# Patient Record
Sex: FEMALE
[De-identification: names, ages, dates, MRNs, and addresses within clinical notes are randomized per-mention and may not be internally consistent; named-entity substitution may affect disease eponyms.]

---

## 2018-03-14 ENCOUNTER — HOSPITAL ENCOUNTER (OUTPATIENT)
Dept: HOSPITAL 31 - C.SDS | Age: 58
Discharge: HOME | End: 2018-03-14
Attending: ANESTHESIOLOGY
Payer: MEDICARE

## 2018-03-14 VITALS — SYSTOLIC BLOOD PRESSURE: 120 MMHG | TEMPERATURE: 97.9 F | DIASTOLIC BLOOD PRESSURE: 51 MMHG

## 2018-03-14 VITALS — HEART RATE: 58 BPM | RESPIRATION RATE: 16 BRPM | OXYGEN SATURATION: 100 %

## 2018-03-14 DIAGNOSIS — M47.22: ICD-10-CM

## 2018-03-14 DIAGNOSIS — M50.20: Primary | ICD-10-CM

## 2018-03-14 PROCEDURE — 20552 NJX 1/MLT TRIGGER POINT 1/2: CPT

## 2018-03-14 PROCEDURE — 62320 NJX INTERLAMINAR CRV/THRC: CPT

## 2018-03-14 NOTE — OP
PROCEDURE DATE:  03/14/2018



PREOPERATIVE DIAGNOSES:

1.  Cervical radiculopathy.

2.  Cervical disc displacement without myopathy.

3.  Myalgias.



POSTOPERATIVE DIAGNOSES:

1.  Cervical radiculopathy.

2.  Cervical disc displacement without myopathy.

3.  Myalgias.



PROCEDURE:

1.  Cervical epidural steroid injection, C6-C7.

2.  Epidurogram.

3.  Trigger point injections.



X-RAY:  [id number], fluoroscopy of the spine.



ANESTHESIA:  MAC/local.



SURGEON:  Orly Marc MD



COMPLICATIONS:  None.



BLOOD LOSS:  2 mL.



BRIEF HISTORY AND INDICATIONS:  The patient has cervical radiculopathy and

upper extremity pain and tingling radiating down from the posterior neck

down to the arm and posterior shoulder side to all fingers.  This patient

presents today for a cervical epidural steroid injection under fluoroscopic

guidance at the C6-C7 interlaminar space.



PROCEDURE IN DETAIL:  The patient, after giving informed consent for the

procedure, was brought back to the procedure room, sitting in a wheelchair.

Routine monitors were applied.  The patient was leant forward with his neck

flexed and forehead placed on a towel on the OR table.  Neck was flexed at

approximately 50 degrees lateral.  Lateral fluoroscopic view was obtained

at the C6-C7 levels.  The patient was prepped and draped in a sterile

fashion.  A 27-gauge needle was used to inject lidocaine 1% subcutaneously

over the skin overlying the interlaminar space of C6-C7.  Subsequently, a

22-gauge Tuohy needle was used to advance into the C6-C7 interlaminar area

using loss of resistance technique to enter the epidural space.  The needle

position was confirmed in lateral views.  Contrast 180 Omnipaque was

injected 0.5 mL showing good epidurogram.  Subsequently, 80 mg of

Depo-Medrol with 1 mL of normal saline was injected with intermittent

negative aspiration.  No heme or CSF was aspirated throughout.  No

paresthesias were elicited throughout.  The patient tolerated the procedure

well.  Vital signs remained stable.  The patient reported slight reduction

in pain post procedure.



Epidurogram:  The patient underwent cervical epidural steroid injection

today.  The epidural was observed at the level of C6-C7 under AP and

lateral fluoroscopic guidance.  2 mL of Omnipaque 200 contrast was injected

that evenly spread from level C4 to C7 level with posterior-anterior dye

spread bilaterally at level of C6-C7 and C5-C6.  There appeared to be a

moderate degree spondylosis at the level of C5-C6 and moderate degree of

spondylosis at the level of C6-C7 with disc protrusion at the level of

C6-C7.  The intervertebral disc height at the level of C5-C6 was slightly

less than normal and intervertebral disc height at the level of C7-T1 was

well maintained.  The neural foramen appeared to be patent.



Conclusion:  Good epidural dye containment from C6-C7 with intervertebral

disc protrusion at the level of C5-C6, C6-C7, maintaining less than normal

intervertebral disc height at level C5-C6.  Spondylosis noted at the level

of C4 through T1.  Copies of the images are on file.



Trigger Point Injections/Greater and Lesser Occipital Nerve Blocks:

Tender to palpation on cervical areas and pain shooting up posterior head. 

Patient signed informed consent.  Cervical area was prepped and draped in

sterile fashion.  A 25-gauge needle was used to inject trigger point areas

in trapezius muscles, paracervical muscles, and rhomboids bilaterally.  The

greater and lesser occipital nerves were also injected bilaterally by

inserting needle 1 cm lateral and inferior to the posterior occipital

protuberance.  Total volume used was 10 mL of 0.25% Marcaine mixed with 40

mg Kenalog.  Intermittent aspiration was done throughout with no heme or

CSF aspirated throughout.  Patient tolerated procedure well.



DISPOSITION:  Patient was taken to the recovery room in stable condition. 

Patient was given instructions to follow up in two weeks and was discharged

in stable condition.  No events or complications.





__________________________________________

Orly Marc MD





DD:  03/14/2018 9:24:27

DT:  03/14/2018 9:59:09

Job # [id number]

## 2018-03-14 NOTE — RAD
PROCEDURE:  Intraoperative Fluoroscopy. 



HISTORY:

CERVICAL SPONDYLOSIS



FINDINGS:

Fluoroscopic assistance was provided for cervical epidural C6-7 

injection. Please refer to the operative report from ELISABETH Casillas.